# Patient Record
Sex: FEMALE | Race: WHITE | NOT HISPANIC OR LATINO | Employment: FULL TIME | URBAN - METROPOLITAN AREA
[De-identification: names, ages, dates, MRNs, and addresses within clinical notes are randomized per-mention and may not be internally consistent; named-entity substitution may affect disease eponyms.]

---

## 2021-10-11 ENCOUNTER — OFFICE VISIT (OUTPATIENT)
Dept: OBSTETRICS AND GYNECOLOGY | Facility: CLINIC | Age: 41
End: 2021-10-11

## 2021-10-11 VITALS
SYSTOLIC BLOOD PRESSURE: 122 MMHG | BODY MASS INDEX: 21.52 KG/M2 | DIASTOLIC BLOOD PRESSURE: 70 MMHG | HEIGHT: 68 IN | WEIGHT: 142 LBS

## 2021-10-11 DIAGNOSIS — Z01.419 ENCOUNTER FOR GYNECOLOGICAL EXAMINATION WITHOUT ABNORMAL FINDING: Primary | ICD-10-CM

## 2021-10-11 PROCEDURE — 99212 OFFICE O/P EST SF 10 MIN: CPT | Performed by: OBSTETRICS & GYNECOLOGY

## 2021-10-11 PROCEDURE — 99386 PREV VISIT NEW AGE 40-64: CPT | Performed by: OBSTETRICS & GYNECOLOGY

## 2021-10-11 RX ORDER — LEVOTHYROXINE SODIUM 0.1 MG/1
100 TABLET ORAL DAILY
COMMUNITY
End: 2022-02-14

## 2021-10-11 NOTE — PROGRESS NOTES
GYN Annual Exam     CC- Here for annual exam.     Otto Hahn is a 40 y.o. female who presents for annual well woman exam. Periods are regular every 28-30 days, lasting several days. Dysmenorrhea:none. Cyclic symptoms include none. No intermenstrual bleeding, spotting, or discharge.  She has noted right-sided pelvic pain that is exacerbated with constipation and certain movements especially hip flexor type movements and position related intercourse will cause pain.  She remembers with her last  there being a little slower healing in the right aspect of her  incision.  She had a tubal sterilization at the time of her .  She denies any specific cyclic relationship of the pain.  The pain will be sharp and usually go away after she stops doing what ever it is that she was doing that caused the pain to come on.  Is not taking medication for this.    OB History        2    Para   2    Term                AB        Living           SAB        IAB        Ectopic        Molar        Multiple        Live Births                    Current contraception: tubal ligation  History of abnormal Pap smear: no  Family history of uterine, colon or ovarian cancer: no  History of abnormal mammogram: no.  Patient does not get regular screening mammograms and opts for thermography.  Family history of breast cancer: no  Last Pap : Several years ago    Past Medical History:   Diagnosis Date   • Disease of thyroid gland        Past Surgical History:   Procedure Laterality Date   • APPENDECTOMY     •  SECTION     • TUBAL ABDOMINAL LIGATION           Current Outpatient Medications:   •  levothyroxine (SYNTHROID, LEVOTHROID) 100 MCG tablet, Take 100 mcg by mouth Daily., Disp: , Rfl:     No Known Allergies    Social History     Tobacco Use   • Smoking status: Never Smoker   • Smokeless tobacco: Never Used   Vaping Use   • Vaping Use: Never used   Substance Use Topics   • Alcohol use: Yes      "Comment: weekend   • Drug use: Never       History reviewed. No pertinent family history.    Review of Systems   Constitutional: Negative for fatigue and fever.   Respiratory: Negative for cough.    Gastrointestinal: Positive for constipation.   Genitourinary: Positive for dyspareunia and pelvic pain.       /70   Ht 172.7 cm (68\")   Wt 64.4 kg (142 lb)   LMP 09/29/2021   BMI 21.59 kg/m²     Physical Exam  Constitutional:       Appearance: Normal appearance. She is normal weight.   Genitourinary:      Bladder and urethral meatus normal.      Right Labia: lesions.        No inguinal adenopathy present in the right or left side.     No vaginal prolapse present.     No vaginal atrophy present.       Right Adnexa: not tender and no mass present.     Left Adnexa: not tender and no mass present.     No cervical motion tenderness or lesion.      Uterus is not fixed or tender.      No urethral tenderness present.      Bladder is not tender.    Neck:      Thyroid: No thyroid mass or thyromegaly.   Chest:      Comments: Breast exam is normal bilaterally with bilateral implants noted.  No masses or skin changes or asymmetry.  Abdominal:      General: Abdomen is flat. A surgical scar is present.      Palpations: Abdomen is soft. There is no mass.      Tenderness: There is abdominal tenderness.      Hernia: There is no hernia in the left inguinal area or right inguinal area.       Lymphadenopathy:      Lower Body: No right inguinal adenopathy. No left inguinal adenopathy.   Vitals reviewed.               Assessment     1) GYN annual well woman exam.   2) right pelvic pain and dyspareunia for several years.  I discussed the findings on the exam and reviewed her clinical presentation.  I told her I did not think the constipation would be due to any type of surgical adhesions but certainly the dyspareunia and the pain could be related although not typically a finding after C-sections.  We discussed possibility of adhesions " and limitation of movement of the ovary or round ligament on the right and possible endometriosis.  Her exam did not yield any specific or any worrisome features.  Discussed doing an ultrasound for completeness sake and she would like to think about that and call if she wants to move forward with that.  She will keep a calendar of her symptoms and degree of discomfort and see if the pain is related to any specific other bodily function.  We did discuss briefly definitive management in the form of a diagnostic laparoscopy.  I also discussed that a gastrointestinal evaluation might be of some benefit as well.     Plan     1) Breast Health - Clinical breast exam yearly, Discussed American cancer society recommendations for breast cancer screening, and Self breast awareness monthly  2) Pap -done with high risk HPV  3) Smoking status-negative  4) Encouraged to be wary of information obtained via social media and internet based on source and search.  5) Follow up prn and one year.       Feroz Enriquez MD   10/11/2021  11:28 EDT

## 2021-10-13 ENCOUNTER — TELEPHONE (OUTPATIENT)
Dept: OBSTETRICS AND GYNECOLOGY | Facility: CLINIC | Age: 41
End: 2021-10-13

## 2021-10-13 LAB
CYTOLOGIST CVX/VAG CYTO: NORMAL
CYTOLOGY CVX/VAG DOC CYTO: NORMAL
CYTOLOGY CVX/VAG DOC THIN PREP: NORMAL
DX ICD CODE: NORMAL
HIV 1 & 2 AB SER-IMP: NORMAL
HPV I/H RISK 4 DNA CVX QL PROBE+SIG AMP: NEGATIVE
OTHER STN SPEC: NORMAL
STAT OF ADQ CVX/VAG CYTO-IMP: NORMAL

## 2021-10-13 NOTE — TELEPHONE ENCOUNTER
----- Message from Feroz Enriquez MD sent at 10/13/2021 10:22 AM EDT -----  Otto, your Pap smear was returned as normal.  HPV screen is negative.

## 2022-01-30 PROBLEM — N39.0 ACUTE UTI (URINARY TRACT INFECTION): Status: ACTIVE | Noted: 2022-01-30

## 2022-04-08 ENCOUNTER — TELEPHONE (OUTPATIENT)
Dept: URGENT CARE | Facility: CLINIC | Age: 42
End: 2022-04-08

## 2022-10-12 ENCOUNTER — OFFICE VISIT (OUTPATIENT)
Dept: OBSTETRICS AND GYNECOLOGY | Facility: CLINIC | Age: 42
End: 2022-10-12

## 2022-10-12 VITALS
WEIGHT: 134.4 LBS | HEIGHT: 68 IN | DIASTOLIC BLOOD PRESSURE: 70 MMHG | SYSTOLIC BLOOD PRESSURE: 132 MMHG | BODY MASS INDEX: 20.37 KG/M2

## 2022-10-12 DIAGNOSIS — R10.2 PELVIC PAIN: ICD-10-CM

## 2022-10-12 DIAGNOSIS — Z01.419 ENCOUNTER FOR GYNECOLOGICAL EXAMINATION WITHOUT ABNORMAL FINDING: Primary | ICD-10-CM

## 2022-10-12 PROCEDURE — 99396 PREV VISIT EST AGE 40-64: CPT | Performed by: OBSTETRICS & GYNECOLOGY

## 2022-10-12 RX ORDER — SODIUM CHLORIDE 0.9 % (FLUSH) 0.9 %
3 SYRINGE (ML) INJECTION EVERY 12 HOURS SCHEDULED
Status: CANCELLED | OUTPATIENT
Start: 2022-10-12

## 2022-10-12 RX ORDER — SODIUM CHLORIDE 0.9 % (FLUSH) 0.9 %
10 SYRINGE (ML) INJECTION AS NEEDED
Status: CANCELLED | OUTPATIENT
Start: 2022-10-12

## 2022-10-12 RX ORDER — VALACYCLOVIR HYDROCHLORIDE 1 G/1
1000 TABLET, FILM COATED ORAL DAILY PRN
COMMUNITY
Start: 2022-08-24

## 2022-10-12 RX ORDER — DEXTROAMPHETAMINE SACCHARATE, AMPHETAMINE ASPARTATE, DEXTROAMPHETAMINE SULFATE AND AMPHETAMINE SULFATE 3.75; 3.75; 3.75; 3.75 MG/1; MG/1; MG/1; MG/1
15 TABLET ORAL DAILY
COMMUNITY
Start: 2022-10-06

## 2022-10-12 NOTE — PROGRESS NOTES
GYN Annual Exam     CC- Here for annual exam.     Otto Hahn is a 41 y.o. female who presents for annual well woman exam. Periods are regular every 28-30 days, lasting several days. Dysmenorrhea:mild, occurring first 1-2 days of flow. Cyclic symptoms include pelvic pain. No intermenstrual bleeding, spotting, or discharge.    She states she continues to have right-sided pelvic pain and increasing discomfort with intercourse that is always on the right side and is positional related and is deep.  This past month that she noted a significant increase in the discomfort on cycle day 9 and cycle day 12 of her menses.  She has no new intervening risk factors for pelvic pain other than she states that the pain started about 18 months after her last  at which time she also had a tubal sterilization.    OB History        2    Para   2    Term   2            AB        Living   2       SAB        IAB        Ectopic        Molar        Multiple        Live Births   2                Current contraception: tubal ligation  History of abnormal Pap smear: no  Family history of uterine, colon or ovarian cancer: no  History of abnormal mammogram: Does not get screening mammograms  Family history of breast cancer: no  Last Pap :     Past Medical History:   Diagnosis Date   • Disease of thyroid gland        Past Surgical History:   Procedure Laterality Date   • APPENDECTOMY     •  SECTION     • COSMETIC SURGERY     • TUBAL ABDOMINAL LIGATION           Current Outpatient Medications:   •  amphetamine-dextroamphetamine (ADDERALL) 15 MG tablet, , Disp: , Rfl:   •  ascorbic acid (VITAMIN C) 100 MG tablet, Take  by mouth., Disp: , Rfl:   •  aspirin 81 MG EC tablet, Take 81 mg by mouth Daily., Disp: , Rfl:   •  B COMPLEX VITAMINS PO, Take  by mouth., Disp: , Rfl:   •  cholecalciferol (VITAMIN D3) 25 MCG (1000 UT) tablet, Take 1,000 Units by mouth Daily., Disp: , Rfl:   •  DHEA 10 MG capsule, Take 5 mg by  "mouth., Disp: , Rfl:   •  MAGNESIUM PO, Take  by mouth., Disp: , Rfl:   •  Thyroid 60 MG PO tablet, Take 60 mg by mouth Daily., Disp: , Rfl:   •  traZODone (DESYREL) 100 MG tablet, Take 100 mg by mouth Every Night., Disp: , Rfl:   •  valACYclovir (VALTREX) 1000 MG tablet, Take 1 tablet by mouth., Disp: , Rfl:   •  Vyvanse 70 MG capsule, , Disp: , Rfl:     Allergies   Allergen Reactions   • Latex Rash   • Adhesive Tape Hives     bandaid , tape       Social History     Tobacco Use   • Smoking status: Never   • Smokeless tobacco: Never   Vaping Use   • Vaping Use: Never used   Substance Use Topics   • Alcohol use: Yes     Comment: weekend   • Drug use: Never       Family History   Problem Relation Age of Onset   • Ovarian cysts Mother    • Endometriosis Mother        Review of Systems   Constitutional: Negative for fatigue and fever.   Respiratory: Negative for cough and shortness of breath.    Genitourinary: Positive for dyspareunia and pelvic pain. Negative for breast pain, menstrual problem and urinary incontinence.       /70   Ht 172.7 cm (68\")   Wt 61 kg (134 lb 6.4 oz)   LMP 10/10/2022   BMI 20.44 kg/m²     Physical Exam  Constitutional:       Appearance: She is normal weight.   Genitourinary:      Bladder and urethral meatus normal.      No lesions in the vagina.      Right Labia: No lesions.     Left Labia: No lesions.     No vaginal tenderness or bleeding.      No vaginal prolapse present.     No vaginal atrophy present.       Right Adnexa: not tender, not full and no mass present.     Left Adnexa: not tender, not full and no mass present.     No cervical motion tenderness, discharge or lesion.      Uterus is not enlarged, fixed or tender.      No uterine mass detected.     Uterus is midaxial.   Neck:      Thyroid: No thyroid mass or thyromegaly.   Abdominal:      General: Abdomen is flat.      Palpations: Abdomen is soft. There is no mass.      Tenderness: There is no abdominal tenderness.      " Hernia: No hernia is present.   Neurological:      Mental Status: She is alert.   Vitals reviewed.               Assessment     1) GYN annual well woman exam.   2) persistent pelvic pain on the right side with dyspareunia.  Recommend starting with transvaginal ultrasound.  We basically had the same discussion that I did last year with her exam and the possibility of some round ligament or other type of adhesion that is limiting mobility on her right side that resulted from the healing during her  and tubal sterilization was certainly discussed.  The timing of it began after that last .  The possibility also of some endometriosis that has occurred especially with some of the cyclic increase in pain was also discussed.  A laparoscopy would be definitive management to address those issues.     Plan     1) Breast Health - Clinical breast exam yearly, Discussed American cancer society recommendations for breast cancer screening, and Self breast awareness monthly  2) Pap -not indicated this year  3) Smoking status-negative  4) Encouraged to be wary of information obtained via social media and internet based on source and search.  5) Follow up prn and one year.       Feroz Enriquez MD   10/12/2022  11:32 EDT

## 2022-10-20 PROBLEM — R10.2 PELVIC PAIN: Status: ACTIVE | Noted: 2022-10-20

## 2022-10-26 ENCOUNTER — OFFICE VISIT (OUTPATIENT)
Dept: OBSTETRICS AND GYNECOLOGY | Facility: CLINIC | Age: 42
End: 2022-10-26

## 2022-10-26 VITALS
DIASTOLIC BLOOD PRESSURE: 70 MMHG | BODY MASS INDEX: 20.28 KG/M2 | SYSTOLIC BLOOD PRESSURE: 134 MMHG | WEIGHT: 133.8 LBS | HEIGHT: 68 IN

## 2022-10-26 DIAGNOSIS — N94.10 FEMALE DYSPAREUNIA: ICD-10-CM

## 2022-10-26 DIAGNOSIS — R10.2 PELVIC PAIN: Primary | ICD-10-CM

## 2022-10-26 PROCEDURE — 99213 OFFICE O/P EST LOW 20 MIN: CPT | Performed by: OBSTETRICS & GYNECOLOGY

## 2022-10-26 RX ORDER — SODIUM CHLORIDE 0.9 % (FLUSH) 0.9 %
10 SYRINGE (ML) INJECTION AS NEEDED
Status: CANCELLED | OUTPATIENT
Start: 2022-12-01

## 2022-10-26 RX ORDER — SODIUM CHLORIDE 0.9 % (FLUSH) 0.9 %
3 SYRINGE (ML) INJECTION EVERY 12 HOURS SCHEDULED
Status: CANCELLED | OUTPATIENT
Start: 2022-12-01

## 2022-10-26 NOTE — PROGRESS NOTES
CC: Ultrasound follow-up  Patient seen after recent annual exam showed normal Pap screen negative high-risk HPV.  She has describes some right pelvic pain and dyspareunia that she can date back to the time of her last  that also included a Filshie clip tubal sterilization in 2016.  We reviewed the ultrasound finding and the uterus is normal size and endometrium appears normal both ovaries are normal with no enlargement or cystic masses.  There is no pelvic free fluid.  There is an incidental note in the left adnexa of shadowing likely involving the Filshie clip.  She is interested in pursuing a more aggressive approach to the pain that she has been dealing with on that side and I discussed doing a laparoscopy with salpingectomy and removal of Filshie clips and assess for any adhesions and/or endometriosis that might be accounting for her pain.  She fully understands also that the laparoscope may not yield anything that could help this discomfort and it may be musculoskeletal or gastrointestinal.  She understands risks of surgery including but not limited to bleeding, infection, injury to adjacent organs.  I also told her that there is generally no role for prophylactic antibiotic usage with laparoscopic procedures.  Assessment: Pelvic pain with dyspareunia  Plan: Laparoscopy with opportunistic salpingectomy and lysis of adhesions.  I spent 20 minutes caring for Otto on this date of service. This time includes time spent by me in the following activities: preparing for the visit, reviewing tests, obtaining and/or reviewing a separately obtained history, counseling and educating the patient/family/caregiver, ordering medications, tests, or procedures and documenting information in the medical record

## 2022-11-03 ENCOUNTER — TELEPHONE (OUTPATIENT)
Dept: OBSTETRICS AND GYNECOLOGY | Facility: CLINIC | Age: 42
End: 2022-11-03

## 2022-11-29 ENCOUNTER — APPOINTMENT (OUTPATIENT)
Dept: PREADMISSION TESTING | Facility: HOSPITAL | Age: 42
End: 2022-11-29

## 2022-12-13 ENCOUNTER — PRE-ADMISSION TESTING (OUTPATIENT)
Dept: PREADMISSION TESTING | Facility: HOSPITAL | Age: 42
End: 2022-12-13

## 2022-12-13 VITALS
WEIGHT: 135 LBS | DIASTOLIC BLOOD PRESSURE: 88 MMHG | HEIGHT: 68 IN | OXYGEN SATURATION: 98 % | BODY MASS INDEX: 20.46 KG/M2 | TEMPERATURE: 99 F | RESPIRATION RATE: 16 BRPM | HEART RATE: 66 BPM | SYSTOLIC BLOOD PRESSURE: 145 MMHG

## 2022-12-13 LAB
ANION GAP SERPL CALCULATED.3IONS-SCNC: 9.1 MMOL/L (ref 5–15)
BUN SERPL-MCNC: 10 MG/DL (ref 6–20)
BUN/CREAT SERPL: 13.2 (ref 7–25)
CALCIUM SPEC-SCNC: 9.5 MG/DL (ref 8.6–10.5)
CHLORIDE SERPL-SCNC: 103 MMOL/L (ref 98–107)
CO2 SERPL-SCNC: 24.9 MMOL/L (ref 22–29)
CREAT SERPL-MCNC: 0.76 MG/DL (ref 0.57–1)
EGFRCR SERPLBLD CKD-EPI 2021: 99.2 ML/MIN/1.73
GLUCOSE SERPL-MCNC: 96 MG/DL (ref 65–99)
HCG SERPL QL: NEGATIVE
POTASSIUM SERPL-SCNC: 4.4 MMOL/L (ref 3.5–5.2)
SODIUM SERPL-SCNC: 137 MMOL/L (ref 136–145)

## 2022-12-13 PROCEDURE — 85025 COMPLETE CBC W/AUTO DIFF WBC: CPT | Performed by: OBSTETRICS & GYNECOLOGY

## 2022-12-13 PROCEDURE — 80048 BASIC METABOLIC PNL TOTAL CA: CPT

## 2022-12-13 PROCEDURE — 36415 COLL VENOUS BLD VENIPUNCTURE: CPT

## 2022-12-13 PROCEDURE — 84703 CHORIONIC GONADOTROPIN ASSAY: CPT

## 2022-12-13 RX ORDER — OMEGA-3 FATTY ACIDS/FISH OIL 300-1000MG
3000 CAPSULE ORAL DAILY
COMMUNITY

## 2022-12-13 RX ORDER — CHLORHEXIDINE GLUCONATE 500 MG/1
CLOTH TOPICAL
COMMUNITY
End: 2022-12-15 | Stop reason: HOSPADM

## 2022-12-13 NOTE — DISCHARGE INSTRUCTIONS
Take the following medications the morning of surgery: THYROID    ARRIVAL TIME IS 1230 ON 12/15/2022      If you are on prescription narcotic pain medication to control your pain you may also take that medication the morning of surgery.    General Instructions:  Do not eat solid food after midnight the night before surgery.  You may drink clear liquids day of surgery but must stop at least one hour before your hospital arrival time.  It is beneficial for you to have a clear drink that contains carbohydrates the day of surgery.  We suggest a 12 to 20 ounce bottle of Gatorade or Powerade for non-diabetic patients or a 12 to 20 ounce bottle of G2 or Powerade Zero for diabetic patients. (Pediatric patients, are not advised to drink a 12 to 20 ounce carbohydrate drink)    Clear liquids are liquids you can see through.  Nothing red in color.     Plain water                               Sports drinks  Sodas                                   Gelatin (Jell-O)  Fruit juices without pulp such as white grape juice and apple juice  Popsicles that contain no fruit or yogurt  Tea or coffee (no cream or milk added)  Gatorade / Powerade  G2 / Powerade Zero      Patients who avoid smoking, chewing tobacco and alcohol for 4 weeks prior to surgery have a reduced risk of post-operative complications.  Quit smoking as many days before surgery as you can.  Do not smoke, use chewing tobacco or drink alcohol the day of surgery.   If applicable bring your C-PAP/ BI-PAP machine.  Bring any papers given to you in the doctor’s office.  Wear clean comfortable clothes.  Do not wear contact lenses, false eyelashes or make-up.  Bring a case for your glasses.   Bring crutches or walker if applicable.  Remove all piercings.  Leave jewelry and any other valuables at home.  Hair extensions with metal clips must be removed prior to surgery.  The Pre-Admission Testing nurse will instruct you to bring medications if unable to obtain an accurate list in  Pre-Admission Testing.          Preventing a Surgical Site Infection:  For 2 to 3 days before surgery, avoid shaving with a razor because the razor can irritate skin and make it easier to develop an infection.    Any areas of open skin can increase the risk of a post-operative wound infection by allowing bacteria to enter and travel throughout the body.  Notify your surgeon if you have any skin wounds / rashes even if it is not near the expected surgical site.  The area will need assessed to determine if surgery should be delayed until it is healed.  The night prior to surgery shower using a fresh bar of anti-bacterial soap (such as Dial) and clean washcloth.  Sleep in a clean bed with clean clothing.  Do not allow pets to sleep with you.  Shower on the morning of surgery using a fresh bar of anti-bacterial soap (such as Dial) and clean washcloth.  Dry with a clean towel and dress in clean clothing.  Ask your surgeon if you will be receiving antibiotics prior to surgery.  Make sure you, your family, and all healthcare providers clean their hands with soap and water or an alcohol based hand  before caring for you or your wound.      CHLORHEXIDINE CLOTH INSTRUCTIONS  The morning of surgery follow these instructions using the Chlorhexidine cloths you've been given.  These steps reduce bacteria on the body.  Do not use the cloths near your eyes, ears mouth, genitalia or on open wounds.  Throw the cloths away after use but do not try to flush them down a toilet.      Open and remove one cloth at a time from the package.    Leave the cloth unfolded and begin the bathing.  Massage the skin with the cloths using gentle pressure to remove bacteria.  Do not scrub harshly.   Follow the steps below with one 2% CHG cloth per area (6 total cloths).  One cloth for neck, shoulders and chest.  One cloth for both arms, hands, fingers and underarms (do underarms last).  One cloth for the abdomen followed by groin.  One cloth  for right leg and foot including between the toes.  One cloth for left leg and foot including between the toes.  The last cloth is to be used for the back of the neck, back and buttocks.    Allow the CHG to air dry 3 minutes on the skin which will give it time to work and decrease the chance of irritation.  The skin may feel sticky until it is dry.  Do not rinse with water or any other liquid or you will lose the beneficial effects of the CHG.  If mild skin irritation occurs, do rinse the skin to remove the CHG.  Report this to the nurse at time of admission.  Do not apply lotions, creams, ointments, deodorants or perfumes after using the clothes. Dress in clean clothes before coming to the hospital.   Day of surgery:  ARRIVAL TIME IS  1230 ON 12/15/2022  Your arrival time is approximately two hours before your scheduled surgery time.  Upon arrival, a Pre-op nurse and Anesthesiologist will review your health history, obtain vital signs, and answer questions you may have.  The only belongings needed at this time will be a list of your home medications and if applicable your C-PAP/BI-PAP machine.  A Pre-op nurse will start an IV and you may receive medication in preparation for surgery, including something to help you relax.     Please be aware that surgery does come with discomfort.  We want to make every effort to control your discomfort so please discuss any uncontrolled symptoms with your nurse.   Your doctor will most likely have prescribed pain medications.      If you are going home after surgery you will receive individualized written care instructions before being discharged.  A responsible adult must drive you to and from the hospital on the day of your surgery and stay with you for 24 hours.  Discharge prescriptions can be filled by the hospital pharmacy during regular pharmacy hours.  If you are having surgery late in the day/evening your prescription may be e-prescribed to your pharmacy.  Please verify  your pharmacy hours or chose a 24 hour pharmacy to avoid not having access to your prescription because your pharmacy has closed for the day.    If you are staying overnight following surgery, you will be transported to your hospital room following the recovery period.  [facility] has all private rooms.    If you have any questions please call Pre-Admission Testing at [phone].  Deductibles and co-payments are collected on the day of service. Please be prepared to pay the required co-pay, deductible or deposit on the day of service as defined by your plan.    Call your surgeon immediately if you experience any of the following symptoms:  Sore Throat  Shortness of Breath or difficulty breathing  Cough  Chills  Body soreness or muscle pain  Headache  Fever  New loss of taste or smell  Do not arrive for your surgery ill.  Your procedure will need to be rescheduled to another time.  You will need to call your physician before the day of surgery to avoid any unnecessary exposure to hospital staff as well as other patients.

## 2022-12-15 ENCOUNTER — ANESTHESIA (OUTPATIENT)
Dept: PERIOP | Facility: HOSPITAL | Age: 42
End: 2022-12-15

## 2022-12-15 ENCOUNTER — ANESTHESIA EVENT (OUTPATIENT)
Dept: PERIOP | Facility: HOSPITAL | Age: 42
End: 2022-12-15

## 2022-12-15 ENCOUNTER — HOSPITAL ENCOUNTER (OUTPATIENT)
Facility: HOSPITAL | Age: 42
Setting detail: HOSPITAL OUTPATIENT SURGERY
Discharge: HOME | End: 2022-12-15
Attending: OBSTETRICS & GYNECOLOGY | Admitting: OBSTETRICS & GYNECOLOGY

## 2022-12-15 VITALS
TEMPERATURE: 98.3 F | HEART RATE: 61 BPM | OXYGEN SATURATION: 99 % | SYSTOLIC BLOOD PRESSURE: 120 MMHG | DIASTOLIC BLOOD PRESSURE: 84 MMHG | RESPIRATION RATE: 16 BRPM

## 2022-12-15 DIAGNOSIS — R10.2 PELVIC PAIN: ICD-10-CM

## 2022-12-15 DIAGNOSIS — Z98.890 S/P LAPAROSCOPY: Primary | ICD-10-CM

## 2022-12-15 PROCEDURE — 25010000002 FENTANYL CITRATE (PF) 50 MCG/ML SOLUTION: Performed by: STUDENT IN AN ORGANIZED HEALTH CARE EDUCATION/TRAINING PROGRAM

## 2022-12-15 PROCEDURE — S0260 H&P FOR SURGERY: HCPCS | Performed by: OBSTETRICS & GYNECOLOGY

## 2022-12-15 PROCEDURE — 25010000002 PROPOFOL 10 MG/ML EMULSION: Performed by: STUDENT IN AN ORGANIZED HEALTH CARE EDUCATION/TRAINING PROGRAM

## 2022-12-15 PROCEDURE — 25010000002 ONDANSETRON PER 1 MG: Performed by: ANESTHESIOLOGY

## 2022-12-15 PROCEDURE — 25010000002 DEXAMETHASONE SODIUM PHOSPHATE 20 MG/5ML SOLUTION: Performed by: ANESTHESIOLOGY

## 2022-12-15 PROCEDURE — 88302 TISSUE EXAM BY PATHOLOGIST: CPT | Performed by: OBSTETRICS & GYNECOLOGY

## 2022-12-15 PROCEDURE — 25010000002 KETOROLAC TROMETHAMINE PER 15 MG: Performed by: ANESTHESIOLOGY

## 2022-12-15 PROCEDURE — 25010000002 MIDAZOLAM PER 1 MG: Performed by: ANESTHESIOLOGY

## 2022-12-15 PROCEDURE — 58662 LAPAROSCOPY EXCISE LESIONS: CPT | Performed by: OBSTETRICS & GYNECOLOGY

## 2022-12-15 RX ORDER — MAGNESIUM HYDROXIDE 1200 MG/15ML
LIQUID ORAL AS NEEDED
Status: DISCONTINUED | OUTPATIENT
Start: 2022-12-15 | End: 2022-12-15 | Stop reason: HOSPADM

## 2022-12-15 RX ORDER — HYDRALAZINE HYDROCHLORIDE 20 MG/ML
5 INJECTION INTRAMUSCULAR; INTRAVENOUS
Status: DISCONTINUED | OUTPATIENT
Start: 2022-12-15 | End: 2022-12-15 | Stop reason: HOSPADM

## 2022-12-15 RX ORDER — FLUMAZENIL 0.1 MG/ML
0.2 INJECTION INTRAVENOUS AS NEEDED
Status: DISCONTINUED | OUTPATIENT
Start: 2022-12-15 | End: 2022-12-15 | Stop reason: HOSPADM

## 2022-12-15 RX ORDER — ONDANSETRON 2 MG/ML
INJECTION INTRAMUSCULAR; INTRAVENOUS AS NEEDED
Status: DISCONTINUED | OUTPATIENT
Start: 2022-12-15 | End: 2022-12-15 | Stop reason: SURG

## 2022-12-15 RX ORDER — LIDOCAINE HYDROCHLORIDE 10 MG/ML
0.5 INJECTION, SOLUTION EPIDURAL; INFILTRATION; INTRACAUDAL; PERINEURAL ONCE AS NEEDED
Status: DISCONTINUED | OUTPATIENT
Start: 2022-12-15 | End: 2022-12-15 | Stop reason: HOSPADM

## 2022-12-15 RX ORDER — NALOXONE HCL 0.4 MG/ML
0.2 VIAL (ML) INJECTION AS NEEDED
Status: DISCONTINUED | OUTPATIENT
Start: 2022-12-15 | End: 2022-12-15 | Stop reason: HOSPADM

## 2022-12-15 RX ORDER — ONDANSETRON 2 MG/ML
4 INJECTION INTRAMUSCULAR; INTRAVENOUS ONCE AS NEEDED
Status: DISCONTINUED | OUTPATIENT
Start: 2022-12-15 | End: 2022-12-15 | Stop reason: HOSPADM

## 2022-12-15 RX ORDER — BUPIVACAINE HYDROCHLORIDE 2.5 MG/ML
INJECTION, SOLUTION INFILTRATION; PERINEURAL AS NEEDED
Status: DISCONTINUED | OUTPATIENT
Start: 2022-12-15 | End: 2022-12-15 | Stop reason: HOSPADM

## 2022-12-15 RX ORDER — HYDROMORPHONE HYDROCHLORIDE 1 MG/ML
0.5 INJECTION, SOLUTION INTRAMUSCULAR; INTRAVENOUS; SUBCUTANEOUS
Status: DISCONTINUED | OUTPATIENT
Start: 2022-12-15 | End: 2022-12-15 | Stop reason: HOSPADM

## 2022-12-15 RX ORDER — DIPHENHYDRAMINE HCL 25 MG
25 CAPSULE ORAL
Status: DISCONTINUED | OUTPATIENT
Start: 2022-12-15 | End: 2022-12-15 | Stop reason: HOSPADM

## 2022-12-15 RX ORDER — OXYCODONE AND ACETAMINOPHEN 7.5; 325 MG/1; MG/1
1 TABLET ORAL EVERY 4 HOURS PRN
Status: DISCONTINUED | OUTPATIENT
Start: 2022-12-15 | End: 2022-12-15 | Stop reason: HOSPADM

## 2022-12-15 RX ORDER — SODIUM CHLORIDE 0.9 % (FLUSH) 0.9 %
3 SYRINGE (ML) INJECTION EVERY 12 HOURS SCHEDULED
Status: DISCONTINUED | OUTPATIENT
Start: 2022-12-15 | End: 2022-12-15 | Stop reason: HOSPADM

## 2022-12-15 RX ORDER — FENTANYL CITRATE 50 UG/ML
50 INJECTION, SOLUTION INTRAMUSCULAR; INTRAVENOUS
Status: DISCONTINUED | OUTPATIENT
Start: 2022-12-15 | End: 2022-12-15 | Stop reason: HOSPADM

## 2022-12-15 RX ORDER — HYDROCODONE BITARTRATE AND ACETAMINOPHEN 7.5; 325 MG/1; MG/1
1 TABLET ORAL ONCE AS NEEDED
Status: COMPLETED | OUTPATIENT
Start: 2022-12-15 | End: 2022-12-15

## 2022-12-15 RX ORDER — ROCURONIUM BROMIDE 10 MG/ML
INJECTION, SOLUTION INTRAVENOUS AS NEEDED
Status: DISCONTINUED | OUTPATIENT
Start: 2022-12-15 | End: 2022-12-15 | Stop reason: SURG

## 2022-12-15 RX ORDER — EPHEDRINE SULFATE 50 MG/ML
5 INJECTION, SOLUTION INTRAVENOUS ONCE AS NEEDED
Status: DISCONTINUED | OUTPATIENT
Start: 2022-12-15 | End: 2022-12-15 | Stop reason: HOSPADM

## 2022-12-15 RX ORDER — KETOROLAC TROMETHAMINE 30 MG/ML
INJECTION, SOLUTION INTRAMUSCULAR; INTRAVENOUS AS NEEDED
Status: DISCONTINUED | OUTPATIENT
Start: 2022-12-15 | End: 2022-12-15 | Stop reason: SURG

## 2022-12-15 RX ORDER — SCOLOPAMINE TRANSDERMAL SYSTEM 1 MG/1
1 PATCH, EXTENDED RELEASE TRANSDERMAL ONCE
Status: DISCONTINUED | OUTPATIENT
Start: 2022-12-15 | End: 2022-12-15 | Stop reason: HOSPADM

## 2022-12-15 RX ORDER — DIPHENHYDRAMINE HYDROCHLORIDE 50 MG/ML
12.5 INJECTION INTRAMUSCULAR; INTRAVENOUS
Status: DISCONTINUED | OUTPATIENT
Start: 2022-12-15 | End: 2022-12-15 | Stop reason: HOSPADM

## 2022-12-15 RX ORDER — SODIUM CHLORIDE 0.9 % (FLUSH) 0.9 %
3-10 SYRINGE (ML) INJECTION AS NEEDED
Status: DISCONTINUED | OUTPATIENT
Start: 2022-12-15 | End: 2022-12-15 | Stop reason: HOSPADM

## 2022-12-15 RX ORDER — FAMOTIDINE 10 MG/ML
20 INJECTION, SOLUTION INTRAVENOUS ONCE
Status: COMPLETED | OUTPATIENT
Start: 2022-12-15 | End: 2022-12-15

## 2022-12-15 RX ORDER — FENTANYL CITRATE 50 UG/ML
INJECTION, SOLUTION INTRAMUSCULAR; INTRAVENOUS AS NEEDED
Status: DISCONTINUED | OUTPATIENT
Start: 2022-12-15 | End: 2022-12-15 | Stop reason: SURG

## 2022-12-15 RX ORDER — MIDAZOLAM HYDROCHLORIDE 1 MG/ML
1 INJECTION INTRAMUSCULAR; INTRAVENOUS
Status: DISCONTINUED | OUTPATIENT
Start: 2022-12-15 | End: 2022-12-15 | Stop reason: HOSPADM

## 2022-12-15 RX ORDER — PROMETHAZINE HYDROCHLORIDE 25 MG/1
25 SUPPOSITORY RECTAL ONCE AS NEEDED
Status: DISCONTINUED | OUTPATIENT
Start: 2022-12-15 | End: 2022-12-15 | Stop reason: HOSPADM

## 2022-12-15 RX ORDER — SODIUM CHLORIDE 0.9 % (FLUSH) 0.9 %
10 SYRINGE (ML) INJECTION AS NEEDED
Status: DISCONTINUED | OUTPATIENT
Start: 2022-12-15 | End: 2022-12-15 | Stop reason: HOSPADM

## 2022-12-15 RX ORDER — PROMETHAZINE HYDROCHLORIDE 25 MG/1
25 TABLET ORAL ONCE AS NEEDED
Status: DISCONTINUED | OUTPATIENT
Start: 2022-12-15 | End: 2022-12-15 | Stop reason: HOSPADM

## 2022-12-15 RX ORDER — LIDOCAINE HYDROCHLORIDE 20 MG/ML
INJECTION, SOLUTION INFILTRATION; PERINEURAL AS NEEDED
Status: DISCONTINUED | OUTPATIENT
Start: 2022-12-15 | End: 2022-12-15 | Stop reason: SURG

## 2022-12-15 RX ORDER — PROPOFOL 10 MG/ML
VIAL (ML) INTRAVENOUS AS NEEDED
Status: DISCONTINUED | OUTPATIENT
Start: 2022-12-15 | End: 2022-12-15 | Stop reason: SURG

## 2022-12-15 RX ORDER — HYDROCODONE BITARTRATE AND ACETAMINOPHEN 5; 325 MG/1; MG/1
1 TABLET ORAL EVERY 6 HOURS PRN
Qty: 16 TABLET | Refills: 0 | Status: SHIPPED | OUTPATIENT
Start: 2022-12-15 | End: 2022-12-22

## 2022-12-15 RX ORDER — DEXAMETHASONE SODIUM PHOSPHATE 4 MG/ML
INJECTION, SOLUTION INTRA-ARTICULAR; INTRALESIONAL; INTRAMUSCULAR; INTRAVENOUS; SOFT TISSUE AS NEEDED
Status: DISCONTINUED | OUTPATIENT
Start: 2022-12-15 | End: 2022-12-15 | Stop reason: SURG

## 2022-12-15 RX ORDER — SODIUM CHLORIDE, SODIUM LACTATE, POTASSIUM CHLORIDE, CALCIUM CHLORIDE 600; 310; 30; 20 MG/100ML; MG/100ML; MG/100ML; MG/100ML
9 INJECTION, SOLUTION INTRAVENOUS CONTINUOUS
Status: DISCONTINUED | OUTPATIENT
Start: 2022-12-15 | End: 2022-12-15 | Stop reason: HOSPADM

## 2022-12-15 RX ORDER — LABETALOL HYDROCHLORIDE 5 MG/ML
5 INJECTION, SOLUTION INTRAVENOUS
Status: DISCONTINUED | OUTPATIENT
Start: 2022-12-15 | End: 2022-12-15 | Stop reason: HOSPADM

## 2022-12-15 RX ADMIN — ROCURONIUM BROMIDE 20 MG: 50 INJECTION INTRAVENOUS at 14:49

## 2022-12-15 RX ADMIN — HYDROCODONE BITARTRATE AND ACETAMINOPHEN 1 TABLET: 7.5; 325 TABLET ORAL at 16:59

## 2022-12-15 RX ADMIN — SODIUM CHLORIDE, POTASSIUM CHLORIDE, SODIUM LACTATE AND CALCIUM CHLORIDE 9 ML/HR: 600; 310; 30; 20 INJECTION, SOLUTION INTRAVENOUS at 14:35

## 2022-12-15 RX ADMIN — LIDOCAINE HYDROCHLORIDE 60 MG: 20 INJECTION, SOLUTION INFILTRATION; PERINEURAL at 14:46

## 2022-12-15 RX ADMIN — FAMOTIDINE 20 MG: 10 INJECTION INTRAVENOUS at 14:35

## 2022-12-15 RX ADMIN — ONDANSETRON 4 MG: 2 INJECTION INTRAMUSCULAR; INTRAVENOUS at 15:23

## 2022-12-15 RX ADMIN — FENTANYL CITRATE 50 MCG: 50 INJECTION, SOLUTION INTRAMUSCULAR; INTRAVENOUS at 15:16

## 2022-12-15 RX ADMIN — SUGAMMADEX 200 MG: 100 INJECTION, SOLUTION INTRAVENOUS at 15:34

## 2022-12-15 RX ADMIN — FENTANYL CITRATE 50 MCG: 50 INJECTION, SOLUTION INTRAMUSCULAR; INTRAVENOUS at 16:35

## 2022-12-15 RX ADMIN — ROCURONIUM BROMIDE 30 MG: 50 INJECTION INTRAVENOUS at 14:47

## 2022-12-15 RX ADMIN — DEXAMETHASONE SODIUM PHOSPHATE 4 MG: 4 INJECTION, SOLUTION INTRAMUSCULAR; INTRAVENOUS at 15:13

## 2022-12-15 RX ADMIN — MIDAZOLAM 1 MG: 1 INJECTION, SOLUTION INTRAMUSCULAR; INTRAVENOUS at 14:35

## 2022-12-15 RX ADMIN — PROPOFOL 50 MG: 10 INJECTION, EMULSION INTRAVENOUS at 14:50

## 2022-12-15 RX ADMIN — PROPOFOL 150 MG: 10 INJECTION, EMULSION INTRAVENOUS at 14:46

## 2022-12-15 RX ADMIN — KETOROLAC TROMETHAMINE 30 MG: 30 INJECTION, SOLUTION INTRAMUSCULAR; INTRAVENOUS at 15:37

## 2022-12-15 RX ADMIN — SCOPALAMINE 1 PATCH: 1 PATCH, EXTENDED RELEASE TRANSDERMAL at 14:35

## 2022-12-15 NOTE — ANESTHESIA PROCEDURE NOTES
Airway  Urgency: elective    Date/Time: 12/15/2022 2:53 PM  Difficult airway    General Information and Staff    Patient location during procedure: OR  Anesthesiologist: Tina Moctezuma MD  CRNA/CAA: Petr Richey CRNA    Indications and Patient Condition  Indications for airway management: airway protection    Preoxygenated: yes  MILS not maintained throughout  Mask difficulty assessment: 1 - vent by mask    Final Airway Details  Final airway type: endotracheal airway      Successful airway: ETT  Cuffed: yes   Successful intubation technique: direct laryngoscopy  Facilitating devices/methods: Bougie  Endotracheal tube insertion site: oral  Blade: Travis  Blade size: 3  ETT size (mm): 7.0  Cormack-Lehane Classification: grade III - view of epiglottis only  Placement verified by: capnometry   Cuff volume (mL): 7  Measured from: lips  ETT/EBT  to lips (cm): 21  Number of attempts at approach: 2  Assessment: lips, teeth, and gum same as pre-op and atraumatic intubation

## 2022-12-15 NOTE — DISCHARGE INSTRUCTIONS
Light activity for the next 48 hours.  No driving for 1 day.  May increase activities thereafter.  Appoint with me in 1 week.      Scopolamine Patch  This patch has been applied to the skin behind one of your ears.  It may stay in place up to 24 hours. You may remove it at any time after your surgery; however, it should be removed after you are up and walking around the next day.  This medicine reduces stomach upset. Side effects may include: dry mouth, dizziness, sleepiness, constipation, or upset stomach.  An allergy would show up as: a rash, itching, wheezing or shortness of breath.  Follow these instructions:  Do not drink alcohol, drive or operate machinery while taking this medicine.  Wear only 1 patch at a time. You can leave the patch on for up to 24 hours.  When you remove the patch, fold it in half with the sticky sides together and throw it away. Wash your hands and the area under the patch.  Do not touch your eye with your hand if it has touched the patch.  Wash your hands well before and after touching the patch.  Sit or stand slowly to avoid dizziness.  Call your doctor if you have:  Any sign of allergy  No relief  Trouble passing urine  Any new or severe symptoms

## 2022-12-15 NOTE — ANESTHESIA PREPROCEDURE EVALUATION
Anesthesia Evaluation     Patient summary reviewed and Nursing notes reviewed                Airway   Mallampati: II  TM distance: >3 FB  Neck ROM: full  Dental      Pulmonary - negative pulmonary ROS   Cardiovascular - negative cardio ROS    Rhythm: regular  Rate: normal        Neuro/Psych  (+) psychiatric history ADD,    GI/Hepatic/Renal/Endo    (+)   thyroid problem     Musculoskeletal (-) negative ROS    Abdominal    Substance History - negative use     OB/GYN negative ob/gyn ROS         Other                        Anesthesia Plan    ASA 2     general     (I have reviewed the patient's history with the patient and the chart, including all pertinent laboratory results and imaging. I have explained the risks of anesthesia including but not limited to dental damage, corneal abrasion, nerve injury, MI, stroke, and death. Questions asked and answered. Anesthetic plan discussed with patient and team as indicated. Patient expressed understanding of the above.  )  intravenous induction     Anesthetic plan, risks, benefits, and alternatives have been provided, discussed and informed consent has been obtained with: patient.        CODE STATUS:

## 2022-12-15 NOTE — H&P
"    Patient Care Team:  Yazmin Quezada APRN as PCP - General (Nurse Practitioner)    Chief complaint pelvic pain, dyspareunia    Subjective     Patient is a 42 y.o. female,  with prior tubal sterilization,  presents with  right pelvic pain and dyspareunia that she can date back to the time of her last  that also included a Filshie clip tubal sterilization in 2016.  We reviewed the ultrasound finding and the uterus is normal size and endometrium appears normal both ovaries are normal with no enlargement or cystic masses.  There is no pelvic free fluid.  There is an incidental note in the left adnexa of shadowing likely involving the Filshie clip.  She is interested in pursuing a more aggressive approach to the pain that she has been dealing with on that side and I discussed doing a laparoscopy with possible salpingectomy and removal of Filshie clips and assess for any adhesions and/or endometriosis that might be accounting for her pain.  She fully understands also that the laparoscope may not yield anything that could help this discomfort and it may be musculoskeletal or gastrointestinal.  She understands risks of surgery including but not limited to bleeding, infection, injury to adjacent organs.  I also told her that there is generally no role for prophylactic antibiotic usage with laparoscopic procedures.    Review of Systems   Pertinent items are noted in HPI, all other systems reviewed and negative    History  Past Medical History:   Diagnosis Date   • Abdominal pain     RIGHT SIDE   • ADD (attention deficit disorder)    • Disease of thyroid gland    • Genetic testing     \"SHOWED HIGH RISK FOR BLOOD CLOTS\"   • History of hemochromatosis    • History of migraine    • Painful coitus, female    • PONV (postoperative nausea and vomiting)      Past Surgical History:   Procedure Laterality Date   • APPENDECTOMY  2009   • BREAST IMPLANT SURGERY  2019   •  SECTION      2014,2016   • " COSMETIC SURGERY  01/2019   • ENDOSCOPY      2008   • TUBAL ABDOMINAL LIGATION  12/26/2016     Family History   Problem Relation Age of Onset   • Ovarian cysts Mother    • Endometriosis Mother    • Malig Hyperthermia Neg Hx      Social History     Tobacco Use   • Smoking status: Never   • Smokeless tobacco: Never   Vaping Use   • Vaping Use: Never used   Substance Use Topics   • Alcohol use: Yes     Comment: WEEKENDS   • Drug use: Never     E-cigarette/Vaping   • E-cigarette/Vaping Use Never User      E-cigarette/Vaping Substances   • Nicotine No    • THC No    • CBD No    • Flavoring No      Medications Prior to Admission   Medication Sig Dispense Refill Last Dose   • Omega 3 1000 MG capsule Take 3,000 mg by mouth Daily. HOLDING FOR SURGERY   Past Week   • Thyroid 60 MG PO tablet Take 60 mg by mouth Daily.   12/15/2022 at 0530   • traZODone (DESYREL) 100 MG tablet Take 50 mg by mouth Every Night. TAKES 1/2 OF 50MG AT BEDTIME   12/14/2022 at 2100   • valACYclovir (VALTREX) 1000 MG tablet Take 1,000 mg by mouth Daily As Needed. FOR OUTBREAKS   Past Month   • amphetamine-dextroamphetamine (ADDERALL) 15 MG tablet Take 15 mg by mouth Daily. HOLD FOR 48 HOURS PRIOR TO SURGERY   12/13/2022   • aspirin 81 MG EC tablet Take 81 mg by mouth Daily.   More than a month   • B COMPLEX VITAMINS PO Take 1 tablet by mouth Daily. HOLD FOR SURGERY   12/13/2022   • Chlorhexidine Gluconate Cloth 2 % pads Apply  topically. AS DIRECTED THE MORNING OF SURGERY AFTER SHOWERING      • Cholecalciferol (vitamin D3) 125 MCG (5000 UT) tablet tablet Take 1,000 Units by mouth Daily. HOLD FOR SURGERY   12/13/2022   • DHEA 10 MG capsule Take 5 mg by mouth. HOLD FOR SURGERY   12/13/2022   • NON FORMULARY B COMPLEX    HOLD FOR SURGERY   12/13/2022   • Vyvanse 70 MG capsule Take 70 mg by mouth Every Morning HOLD FOR 48 HOURS PRIOR TO SURGERY   12/13/2022     Allergies:  Latex and Adhesive tape    Objective     Vital Signs  Temp:  [98.5 °F (36.9 °C)]  98.5 °F (36.9 °C)  Heart Rate:  [67] 67  Resp:  [16] 16  BP: (121)/(83) 121/83    Physical Exam:      General Appearance:    Alert, cooperative, in no acute distress   Head:    Normocephalic, without obvious abnormality, atraumatic   Eyes:            Lids and lashes normal, conjunctivae and sclerae normal, no   icterus, no pallor, corneas clear, PERRLA   Ears:    Ears appear intact with no abnormalities noted   Throat:   No oral lesions, no thrush, oral mucosa moist   Neck:   No adenopathy, supple, trachea midline, no thyromegaly, no     carotid bruit, no JVD   Back:     No kyphosis present, no scoliosis present, no skin lesions,       erythema or scars, no tenderness to percussion or                   palpation,   range of motion normal   Lungs:     Clear to auscultation,respirations regular, even and                   unlabored    Heart:    Regular rhythm and normal rate, normal S1 and S2, no            murmur, no gallop, no rub, no click   Breast Exam:    Deferred   Abdomen:     Normal bowel sounds, no masses, no organomegaly, soft        non-tender, non-distended, no guarding, no rebound                 tenderness   Genitalia:    Deferred   Extremities:   Moves all extremities well, no edema, no cyanosis, no              redness   Pulses:   Pulses palpable and equal bilaterally   Skin:   No bleeding, bruising or rash   Lymph nodes:   No palpable adenopathy   Neurologic:   Cranial nerves 2 - 12 grossly intact, sensation intact, DTR        present and equal bilaterally       Results Review:    I reviewed the patient's new clinical results.    Assessment & Plan       Pelvic pain  Pelvic adhesive disease    Plan: Laparoscopy with lysis of appropriate adhesions and possible salpingectomy with possible removal of Filshie clips and possible removal of endometriosis.    I discussed the patients findings and my recommendations with patient.     Feroz Enriquez MD  12/15/22  14:16 EST    Time: .

## 2022-12-15 NOTE — ANESTHESIA POSTPROCEDURE EVALUATION
Patient: Otto Hahn    Procedure Summary     Date: 12/15/22 Room / Location: [facility] OR  / [facility] MAIN OR    Anesthesia Start: 1441 Anesthesia Stop: 1552    Procedure: LAPAROSCOPIC PARTIAL RIGHT  SALPINGECTOMY AND REMOVAL OF FILSHIE CLIPS (Bilateral: Abdomen) Diagnosis:       Pelvic pain      (Pelvic pain [R10.2])    Surgeons: Feroz Enriquez MD Provider: Tina Moctezuma MD    Anesthesia Type: general ASA Status: 2          Anesthesia Type: general    Vitals  Vitals Value Taken Time   /82 12/15/22 1701   Temp 36.8 °C (98.3 °F) 12/15/22 1550   Pulse 64 12/15/22 1704   Resp     SpO2 99 % 12/15/22 1704   Vitals shown include unvalidated device data.        Post Anesthesia Care and Evaluation    Patient location during evaluation: PHASE II  Patient participation: complete - patient participated  Level of consciousness: awake  Pain management: adequate    Airway patency: patent  Anesthetic complications: No anesthetic complications    Cardiovascular status: acceptable  Respiratory status: acceptable  Hydration status: acceptable    Comments: /82   Pulse 58   Temp 36.8 °C (98.3 °F) (Oral)   Resp 16   LMP 12/08/2022 (Approximate)   SpO2 98%

## 2022-12-15 NOTE — OP NOTE
Subjective     Date of Service:  12/15/22  Time of Service:  15:48 EST    Surgical Staff: Surgeon(s) and Role:     * Feroz Enriquez MD - Primary   Additional Staff:  None   Pre-operative diagnosis(es): Pre-Op Diagnosis Codes:     * Pelvic pain [R10.2]     Post-operative diagnosis(es): Post-Op Diagnosis Codes:     * Pelvic pain [R10.2]   Procedure(s): Procedure(s):  LAPAROSCOPIC PARTIAL RIGHT  SALPINGECTOMY AND REMOVAL OF FILSHIE CLIPS     Antibiotics:  None ordered on call to OR     Anesthesia: Type: General  ASA:  II     Objective      Operative findings:  Normal-appearing uterus and ovaries bilaterally.  Evidence of prior tubal sterilization procedure with 2 Filshie clips on each fallopian tube.  The Filshie clips on the right fallopian tube had migrated behind the uterus and behind the broad ligament very close to the uterine side.  No evidence of endometriosis.  Minimal adhesions in the right lower quadrant with hemoclips and staples consistent with her prior appendectomy.  Normal right round ligament and ovarian fossa normal left round ligament and left ovarian fossa.  Normal uterosacral ligaments with no evidence of restriction of movement of the uterus or of endometriosis.  Normal anterior cul-de-sac as well as anterior abdominal wall with no evidence of incisional hernia from prior  section.   Specimens removed: ID Type Source Tests Collected by Time   A (Not marked as sent) : PORTION OF RIGHT FALLOPIAN TUBE WITH 2 FILSHIE CLIPS  Tissue Fallopian Tube, Right TISSUE PATHOLOGY EXAM Feroz Enriquez MD 12/15/2022 1524      Fluid Intake: .mL   Output: Documented Output  Est. Blood Loss less than 5 mL  Urine Output .mL  Other Output .mL  NG/OG Output .mL    No intake/output data recorded.     Blood products used: No   Drains: * No LDAs found *   Implant Information: Nothing was implanted during the procedure   Complications:  None   Intraoperative procedure:  Patient taken operating room adequate  anesthetic administered.  She is in low lithotomy position in the yellowfin stirrups.  She is prepped and draped and timeout was performed.  We placed a Hulka tenaculum on the uterus for manipulation and the bladder had been drained.  When above infiltrated with half percent Marcaine carried out at 3 different incision sites.  Infraumbilical incision is made a horizontal fashion and a 5 mm Optiview port placed.  Pneumoperitoneum was achieved and another 5 mm ports placed in left lower quadrant.  The above findings were noted with manipulation of the uterus and fallopian tubes and ovaries.  The only thing that was noted from the pain and/or dyspareunia standpoint was the location of the 2 right Filshie clips next to the uterus and behind the round ligament.  There was otherwise normal postsurgical necrosis of the appropriate amount of fallopian tube on the right side.  The left side the Filshie clips were placed in the isthmic portion of the fallopian tube and were still present and that location.  Decision was made to excise the proximal portion of the fallopian tube next to the uterus and remove the 2 Filshie clips from that location.  This was done with the Enseal device and gentle traction.  The clips were removed without difficulty as well as the small proximal portion of the fallopian tube.  There was a loop of small intestine adhered behind the right ovary but was not inflamed or obstructed in any manner and I did not think that this was related to her preoperative pain complaints.  The bowel wall was normal and there were normal hemoclips in the peritoneum near the distal ileum and cecum as evidence from previous appendectomy.  CO2 gas was then allowed to expel 4-0 Vicryl was used to close skin at each of skin sites.  Vacuum was removed there is no further bleeding   Condition: stable   Disposition: to PACU and then admit to  Home         Assessment & Plan     Status post laparoscopic partial right  salpingectomy and removal of Filshie clips              Feroz Enriquez MD  12/15/22  15:47 EST

## 2022-12-19 ENCOUNTER — TELEPHONE (OUTPATIENT)
Dept: OBSTETRICS AND GYNECOLOGY | Facility: CLINIC | Age: 42
End: 2022-12-19

## 2022-12-19 NOTE — TELEPHONE ENCOUNTER
Patient is calling concerned, she says since procedure her vision is drastically different, she has loss of  in left hand, and short term memory is off. Pt wondering if this could be due to anesthesia and if she should not be driving.     Please advise,  Thank you

## 2022-12-20 ENCOUNTER — TELEPHONE (OUTPATIENT)
Dept: OBSTETRICS AND GYNECOLOGY | Facility: CLINIC | Age: 42
End: 2022-12-20

## 2022-12-20 LAB
LAB AP CASE REPORT: NORMAL
PATH REPORT.FINAL DX SPEC: NORMAL
PATH REPORT.GROSS SPEC: NORMAL

## 2022-12-20 NOTE — TELEPHONE ENCOUNTER
----- Message from Feroz Enriquez MD sent at 12/20/2022 11:11 AM EST -----  Otto, your pathology report came back all benign.  I hope you are feeling better and we will touch base on your follow-up appointment

## 2022-12-22 ENCOUNTER — OFFICE VISIT (OUTPATIENT)
Dept: OBSTETRICS AND GYNECOLOGY | Facility: CLINIC | Age: 42
End: 2022-12-22

## 2022-12-22 VITALS
SYSTOLIC BLOOD PRESSURE: 118 MMHG | HEIGHT: 68 IN | BODY MASS INDEX: 20.67 KG/M2 | DIASTOLIC BLOOD PRESSURE: 62 MMHG | WEIGHT: 136.4 LBS

## 2022-12-22 DIAGNOSIS — Z09 POSTOPERATIVE FOLLOW-UP: Primary | ICD-10-CM

## 2022-12-22 PROCEDURE — 99024 POSTOP FOLLOW-UP VISIT: CPT | Performed by: OBSTETRICS & GYNECOLOGY

## 2022-12-22 NOTE — PROGRESS NOTES
HPI  Otto Hahn presents for postoperative follow up s/p 1 week after laparoscopy with excision of part of the right fallopian tube and right Filshie clips. The patient has had a relatively normal postoperative course.  The patient has had no current complaints. The patient has had improving normal postoperative pain.  The patient has had no issues with the wound. \  She had some brief mental confusion about 4 days after the procedure.  She has had no ongoing bleeding or fever or chills.  Her incisions are healing appropriately.  We discussed the findings and I reviewed the photos with her.  She could tell that the area of the excision and dissection of the Filshie clips on the right was the area that she had been having pain immediately after the surgery.        Physical Exam  General:  alert, appears stated age and cooperative  Incision:   healing well, no drainage, no seroma, no swelling, incision well approximated     Assessment & Plan   Assessment:    1. Postoperative follow-up    Status post laparoscopic excision of right Filshie clip and excision of proximal fallopian tube.  Will allow her to increase all activities as tolerated including exercise.    Plan:   Continue postoperative wound care as instructed        Return in 1 year (on 12/22/2023).     Feroz Enriquez MD  12/22/2022

## 2023-08-17 ENCOUNTER — OFFICE VISIT (OUTPATIENT)
Dept: OBSTETRICS AND GYNECOLOGY | Facility: CLINIC | Age: 43
End: 2023-08-17
Payer: COMMERCIAL

## 2023-08-17 VITALS
DIASTOLIC BLOOD PRESSURE: 64 MMHG | BODY MASS INDEX: 19.7 KG/M2 | WEIGHT: 130 LBS | SYSTOLIC BLOOD PRESSURE: 110 MMHG | HEIGHT: 68 IN

## 2023-08-17 DIAGNOSIS — N93.9 ABNORMAL UTERINE BLEEDING (AUB): Primary | ICD-10-CM

## 2023-08-17 DIAGNOSIS — R10.2 PELVIC PAIN: ICD-10-CM

## 2023-08-17 RX ORDER — BUPROPION HYDROCHLORIDE 300 MG/1
TABLET ORAL
COMMUNITY
Start: 2023-08-02

## 2023-08-17 RX ORDER — LISDEXAMFETAMINE DIMESYLATE 50 MG
50 CAPSULE ORAL
COMMUNITY
Start: 2023-07-27

## 2023-08-17 RX ORDER — DEXTROAMPHETAMINE SACCHARATE, AMPHETAMINE ASPARTATE, DEXTROAMPHETAMINE SULFATE AND AMPHETAMINE SULFATE 5; 5; 5; 5 MG/1; MG/1; MG/1; MG/1
TABLET ORAL
COMMUNITY
Start: 2023-07-26

## 2023-08-17 NOTE — PROGRESS NOTES
"        REASON FOR FOLLOWUP/CHIEF COMPLAINT:  (Patient is here today for heavy AUB. Patient is having some on/off left pelvic pain. )      HISTORY OF PRESENT ILLNESS: She had previous regular menses.  She states that the pain in her right pelvis is completely gone since her surgery in December 2022.  This last menses she stopped and if couple days later started and had very heavy flow and actually bled through tampon.  The bleeding has started to taper today.  She is on no new medications.  She also has reported some brief twinges of pain in the left pelvic area on a few episodes.  Nothing that is constant or regular.      Past Medical History, Past Surgical History, Social History, Family History have been reviewed and are without significant changes except as mentioned.    Review of Systems   Review of Systems   Constitutional:  Negative for fatigue.   Respiratory:  Negative for cough.    Genitourinary:  Positive for menstrual problem and pelvic pain.     Medications:  The current medication list was reviewed in the EMR    ALLERGIES:    Allergies   Allergen Reactions    Latex Rash    Adhesive Tape Hives     bandaid , tape              Vitals:    08/17/23 1037   BP: 110/64   Weight: 59 kg (130 lb)   Height: 172.7 cm (68\")     Physical Exam    CONSTITUTIONAL:  Vital signs reviewed.  No distress, looks comfortable.    PSYCHIATRIC:  Normal judgment and insight.  Normal mood and affect.       RECENT LABS:  WBC   Date Value Ref Range Status   12/13/2022 5.50 3.40 - 10.80 10*3/mm3 Final   08/29/2022 5.15 4.5 - 11.0 10*3/uL Final     Hemoglobin   Date Value Ref Range Status   12/13/2022 12.8 12.0 - 15.9 g/dL Final   08/29/2022 13.5 12.0 - 16.0 g/dL Final     Platelets   Date Value Ref Range Status   12/13/2022 256 140 - 450 10*3/mm3 Final   08/29/2022 256 140 - 440 10*3/uL Final       ASSESSMENT/PLAN:  Otto Hahn [unfilled]   1.  Abnormal uterine bleeding episode.  History of normal ultrasound in 10 of 2022 but will repeat " ultrasound to make sure the endometrium continues to appear normal.  I had a lengthy discussion centered around irregular bleeding and that some of these are isolated episodes that will typically correct over time.  Most of these will end up being hormone mediated in some fashion and will self resolve.  The role of hormone management if this is an ongoing or repetitive problem was also discussed.

## (undated) DEVICE — UNDYED BRAIDED (POLYGLACTIN 910), SYNTHETIC ABSORBABLE SUTURE: Brand: COATED VICRYL

## (undated) DEVICE — ENDOPATH XCEL UNIVERSAL TROCAR STABLILITY SLEEVES: Brand: ENDOPATH XCEL

## (undated) DEVICE — LOU GYN LAPAROSCOPY: Brand: MEDLINE INDUSTRIES, INC.

## (undated) DEVICE — APPL CHLORAPREP HI/LITE 26ML ORNG

## (undated) DEVICE — ENDOPATH XCEL BLADELESS TROCARS WITH STABILITY SLEEVES: Brand: ENDOPATH XCEL

## (undated) DEVICE — LAPAROVUE VISIBILITY SYSTEM LAPAROSCOPIC SOLUTIONS: Brand: LAPAROVUE